# Patient Record
Sex: MALE | Race: WHITE | Employment: UNEMPLOYED | URBAN - METROPOLITAN AREA
[De-identification: names, ages, dates, MRNs, and addresses within clinical notes are randomized per-mention and may not be internally consistent; named-entity substitution may affect disease eponyms.]

---

## 2020-10-13 NOTE — H&P
[address] [facility] [address] Gastroenterology                                                                                                  Clinic History and Physical     Pa Outpatient Medications   Medication Sig Dispense Refill   • PEG 3350-KCl-Na Bicarb-NaCl (TRILYTE) 420 g Oral Recon Soln Take prep as directed by gastro office.  May substitute with Trilyte/generic equivalent if needed 1 Bottle 0   • glipiZIDE ER 2.5 MG Oral x4, and patient is having movements of all 4 extremities   Psych: Pt has a normal mood and affect, behavior is normal    Nursing note and vitals reviewed      Labs/Imaging:     Patient's labs and imaging were reviewed and discussed with patient today.   See 5-10% discussed. It is the patient's responsibility to contact his/her insurance company regarding questions about out-of-pocket cost/benefits and was provided the appropriate diagnostic information/codes.  All questions were answered to the patient’s sati

## 2020-10-27 ENCOUNTER — OFFICE VISIT (OUTPATIENT)
Dept: GASTROENTEROLOGY | Facility: CLINIC | Age: 50
End: 2020-10-27
Payer: MEDICAID

## 2020-10-27 ENCOUNTER — TELEPHONE (OUTPATIENT)
Dept: GASTROENTEROLOGY | Facility: CLINIC | Age: 50
End: 2020-10-27

## 2020-10-27 VITALS
TEMPERATURE: 98 F | BODY MASS INDEX: 32.37 KG/M2 | HEART RATE: 99 BPM | WEIGHT: 239 LBS | DIASTOLIC BLOOD PRESSURE: 86 MMHG | HEIGHT: 72 IN | SYSTOLIC BLOOD PRESSURE: 133 MMHG

## 2020-10-27 DIAGNOSIS — Z12.11 SCREENING FOR COLON CANCER: Primary | ICD-10-CM

## 2020-10-27 DIAGNOSIS — Z12.11 COLON CANCER SCREENING: Primary | ICD-10-CM

## 2020-10-27 PROCEDURE — 3075F SYST BP GE 130 - 139MM HG: CPT | Performed by: NURSE PRACTITIONER

## 2020-10-27 PROCEDURE — 99203 OFFICE O/P NEW LOW 30 MIN: CPT | Performed by: NURSE PRACTITIONER

## 2020-10-27 PROCEDURE — 3008F BODY MASS INDEX DOCD: CPT | Performed by: NURSE PRACTITIONER

## 2020-10-27 PROCEDURE — 3079F DIAST BP 80-89 MM HG: CPT | Performed by: NURSE PRACTITIONER

## 2020-10-27 RX ORDER — LOSARTAN POTASSIUM 50 MG/1
TABLET ORAL
COMMUNITY
Start: 2020-10-25

## 2020-10-27 RX ORDER — GLIPIZIDE 2.5 MG/1
2.5 TABLET, EXTENDED RELEASE ORAL DAILY
COMMUNITY
Start: 2020-10-13

## 2020-10-27 RX ORDER — BLOOD SUGAR DIAGNOSTIC
STRIP MISCELLANEOUS
COMMUNITY
Start: 2020-10-26

## 2020-10-27 RX ORDER — SITAGLIPTIN 100 MG/1
100 TABLET, FILM COATED ORAL DAILY
COMMUNITY
Start: 2020-09-28

## 2020-10-27 RX ORDER — POLYETHYLENE GLYCOL 3350, SODIUM CHLORIDE, SODIUM BICARBONATE, POTASSIUM CHLORIDE 420; 11.2; 5.72; 1.48 G/4L; G/4L; G/4L; G/4L
POWDER, FOR SOLUTION ORAL
Qty: 1 BOTTLE | Refills: 0 | Status: ON HOLD | OUTPATIENT
Start: 2020-10-27 | End: 2021-04-07

## 2020-10-27 NOTE — TELEPHONE ENCOUNTER
Scheduled for: Colonoscopy 33166   Provider Name: Dr Doreen Harrison    Date: Pati Flbina 1/21/2021   Location: [facility]   Sedation: MAC   Time: 8:15 am   Prep: split dose colyte   Meds/Allergies Reconciled?: NKDA   Diagnosis with codes: Screening Z12.11    Was patient informe

## 2020-10-27 NOTE — PATIENT INSTRUCTIONS
Recommend:  -Schedule colonoscopy w/ Dr. Chito Finch with MAC or Dr. Philippe Schaefer with [facility] or MAC  Dx: screening  -Eligible for NE: No r/t history of hypertension/diabetes  -Prep: Split dose Colyte/TriLyte or equivalent  -Anti-platelets and anti-coagulan

## 2021-01-13 ENCOUNTER — TELEPHONE (OUTPATIENT)
Dept: GASTROENTEROLOGY | Facility: CLINIC | Age: 51
End: 2021-01-13

## 2021-01-13 NOTE — TELEPHONE ENCOUNTER
Patient aware ok to take rosuvastatin the evening before the procedure. This is the only new medication. He is aware to hold glipizide/metformin/januvia the day before and day of the procedure and to hold losartan the night before/morning of procedure.

## 2021-01-13 NOTE — TELEPHONE ENCOUNTER
Patient has colonoscopy scheduled 1/21/2021 and is on a new medication rx:Rosuvastatin, has questions. Please call at:[phone],thanks.

## 2021-01-18 ENCOUNTER — TELEPHONE (OUTPATIENT)
Dept: GASTROENTEROLOGY | Facility: CLINIC | Age: 51
End: 2021-01-18

## 2021-01-18 DIAGNOSIS — Z12.11 COLON CANCER SCREENING: Primary | ICD-10-CM

## 2021-01-18 NOTE — TELEPHONE ENCOUNTER
Rescheduled for:  Colonoscopy 14738  Provider Name:  Dr. Sarah Oliveira  Date:    From-1/21/21  To-4/7/21  Location:    [facility]  Sedation:  MAC  Time:    From-0815  To-1230 (pt is aware to arrive at 1130)   Prep:  Nicki, mailed new instructions on 1/19/21  Meds/Allerg

## 2021-04-04 ENCOUNTER — LAB ENCOUNTER (OUTPATIENT)
Dept: LAB | Facility: HOSPITAL | Age: 51
End: 2021-04-04
Attending: INTERNAL MEDICINE
Payer: MEDICAID

## 2021-04-04 DIAGNOSIS — Z01.818 PRE-OP TESTING: ICD-10-CM

## 2021-04-07 ENCOUNTER — ANESTHESIA (OUTPATIENT)
Dept: ENDOSCOPY | Facility: HOSPITAL | Age: 51
End: 2021-04-07
Payer: MEDICAID

## 2021-04-07 ENCOUNTER — HOSPITAL ENCOUNTER (OUTPATIENT)
Facility: HOSPITAL | Age: 51
Setting detail: HOSPITAL OUTPATIENT SURGERY
Discharge: HOME | End: 2021-04-07
Attending: INTERNAL MEDICINE | Admitting: INTERNAL MEDICINE
Payer: MEDICAID

## 2021-04-07 ENCOUNTER — ANESTHESIA EVENT (OUTPATIENT)
Dept: ENDOSCOPY | Facility: HOSPITAL | Age: 51
End: 2021-04-07
Payer: MEDICAID

## 2021-04-07 VITALS
HEART RATE: 77 BPM | RESPIRATION RATE: 19 BRPM | SYSTOLIC BLOOD PRESSURE: 151 MMHG | HEIGHT: 72 IN | DIASTOLIC BLOOD PRESSURE: 85 MMHG | BODY MASS INDEX: 32.51 KG/M2 | TEMPERATURE: 97 F | OXYGEN SATURATION: 95 % | WEIGHT: 240 LBS

## 2021-04-07 DIAGNOSIS — Z12.11 COLON CANCER SCREENING: ICD-10-CM

## 2021-04-07 DIAGNOSIS — Z01.818 PRE-OP TESTING: Primary | ICD-10-CM

## 2021-04-07 PROCEDURE — 45385 COLONOSCOPY W/LESION REMOVAL: CPT | Performed by: INTERNAL MEDICINE

## 2021-04-07 PROCEDURE — 0DBK8ZX EXCISION OF ASCENDING COLON, VIA NATURAL OR ARTIFICIAL OPENING ENDOSCOPIC, DIAGNOSTIC: ICD-10-PCS | Performed by: INTERNAL MEDICINE

## 2021-04-07 PROCEDURE — 0DBL8ZX EXCISION OF TRANSVERSE COLON, VIA NATURAL OR ARTIFICIAL OPENING ENDOSCOPIC, DIAGNOSTIC: ICD-10-PCS | Performed by: INTERNAL MEDICINE

## 2021-04-07 RX ORDER — SODIUM CHLORIDE, SODIUM LACTATE, POTASSIUM CHLORIDE, CALCIUM CHLORIDE 600; 310; 30; 20 MG/100ML; MG/100ML; MG/100ML; MG/100ML
INJECTION, SOLUTION INTRAVENOUS CONTINUOUS
Status: DISCONTINUED | OUTPATIENT
Start: 2021-04-07 | End: 2021-04-07

## 2021-04-07 RX ORDER — DEXTROSE MONOHYDRATE 25 G/50ML
50 INJECTION, SOLUTION INTRAVENOUS
Status: DISCONTINUED | OUTPATIENT
Start: 2021-04-07 | End: 2021-04-07

## 2021-04-07 RX ORDER — NALOXONE HYDROCHLORIDE 0.4 MG/ML
80 INJECTION, SOLUTION INTRAMUSCULAR; INTRAVENOUS; SUBCUTANEOUS AS NEEDED
Status: DISCONTINUED | OUTPATIENT
Start: 2021-04-07 | End: 2021-04-07

## 2021-04-07 NOTE — OPERATIVE REPORT
[facility] HOSP - [facility] Endoscopy Report      Preoperative Diagnosis:  - colon cancer screening      Postoperative Diagnosis:  - colon polyps x 3  - internal hemorrhoids    Procedure:    Colonoscopy     Surgeon:  Sagrario Bob M.D.     Anesthesia:  MAC

## 2021-04-07 NOTE — H&P
History & Physical Examination    Patient Name: Carlos Powers  MRN: [id number]  CSN: [id number]  YOB: 1970    Diagnosis:   Colon cancer screening        glipiZIDE ER 2.5 MG Oral Tablet 24 Hr, Take 2.5 mg by mouth daily. , Disp: , Rfl: , 4/

## 2021-04-07 NOTE — ANESTHESIA PREPROCEDURE EVALUATION
Anesthesia PreOp Note    HPI:     Debbie Cornell is a 46year old male who presents for preoperative consultation requested by: Scott Medina MD    Date of Surgery: 4/7/2021    Procedure(s):  COLONOSCOPY  Indication: Colon cancer screening    Relev Smoker      Smokeless tobacco: Never Used    Vaping Use      Vaping Use: Never used    Substance and Sexual Activity      Alcohol use: Yes        Comment: socially      Drug use: Never      Sexual activity: Not on file    Other Topics      Concerns: Cardiovascular - normal exam  (+) hypertension,     Neuro/Psych - negative ROS     GI/Hepatic/Renal - negative ROS     Endo/Other    (+) diabetes mellitus,   Abdominal  - normal exam               Anesthesia Plan:   ASA:  3  Plan:   MAC  Post-op Pain Manag

## 2021-04-07 NOTE — ANESTHESIA POSTPROCEDURE EVALUATION
Patient: Carlitos [facility]    Procedure Summary     Date: 04/07/21 Room / Location: [address] [place] [address] ENDOSCOPY 05 / [address] [place] [address] ENDOSCOPY    Anesthesia Start: 1653 Anesthesia Stop:     Procedure: COLONOSCOPY (N/A ) Diagnosis:       Colon cancer screening      (colon polyps; h

## 2021-04-12 ENCOUNTER — TELEPHONE (OUTPATIENT)
Dept: GASTROENTEROLOGY | Facility: CLINIC | Age: 51
End: 2021-04-12

## 2021-04-12 NOTE — TELEPHONE ENCOUNTER
----- Message from Eduardo Mg MD sent at 4/9/2021  1:20 PM CDT -----  I wanted to get back to you with your colonoscopy results. You had 3 colon polyps removed which were benign.   I would advise a repeat colonoscopy in 3 years to make sure no new po

## 2024-02-06 ENCOUNTER — TELEPHONE (OUTPATIENT)
Facility: CLINIC | Age: 54
End: 2024-02-06

## 2024-02-06 NOTE — TELEPHONE ENCOUNTER
Patient outreach message received:    Patient saw Dr. Oconnell's note via [employer].  Recall put in for 3 years.     Recall reminder letter mailed out to patient.

## 2024-08-19 ENCOUNTER — OFFICE VISIT (OUTPATIENT)
Dept: OTOLARYNGOLOGY | Facility: CLINIC | Age: 54
End: 2024-08-19

## 2024-08-19 ENCOUNTER — OFFICE VISIT (OUTPATIENT)
Dept: AUDIOLOGY | Facility: CLINIC | Age: 54
End: 2024-08-19

## 2024-08-19 DIAGNOSIS — H91.8X3 ASYMMETRICAL HEARING LOSS: ICD-10-CM

## 2024-08-19 DIAGNOSIS — H93.11 TINNITUS, RIGHT: ICD-10-CM

## 2024-08-19 DIAGNOSIS — H61.23 BILATERAL IMPACTED CERUMEN: Primary | ICD-10-CM

## 2024-08-19 DIAGNOSIS — H93.11 TINNITUS, RIGHT: Primary | ICD-10-CM

## 2024-08-19 PROCEDURE — 92567 TYMPANOMETRY: CPT | Performed by: AUDIOLOGIST

## 2024-08-19 PROCEDURE — 92557 COMPREHENSIVE HEARING TEST: CPT | Performed by: AUDIOLOGIST

## 2024-08-19 PROCEDURE — 69210 REMOVE IMPACTED EAR WAX UNI: CPT | Performed by: STUDENT IN AN ORGANIZED HEALTH CARE EDUCATION/TRAINING PROGRAM

## 2024-08-19 PROCEDURE — 99204 OFFICE O/P NEW MOD 45 MIN: CPT | Performed by: STUDENT IN AN ORGANIZED HEALTH CARE EDUCATION/TRAINING PROGRAM

## 2024-08-19 NOTE — PROGRESS NOTES
Rickie Castillo is a 54 year old male.   Chief Complaint   Patient presents with    Ear Problem     Ear cleaning  Right ringing in ear      HPI:   54-year-old presents with tinnitus in the right ear that is nonpulsatile for about 1 to 2 years.  He said it happened after an ear cleaning where he had ringing in both his ears but the left ear has resolved    Current Outpatient Medications   Medication Sig Dispense Refill    glipiZIDE ER 2.5 MG Oral Tablet 24 Hr Take 1 tablet (2.5 mg total) by mouth daily.      losartan Potassium 50 MG Oral Tab       metFORMIN HCl 500 MG Oral Tab Take by mouth 2 (two) times daily.      JANUVIA 100 MG Oral Tab Take 1 tablet (100 mg total) by mouth daily.      ONETOUCH ULTRA In Vitro Strip       Multiple Vitamins-Minerals (CENTRUM SILVER 50+MEN) Oral Tab Take by mouth.        Past Medical History:    Diabetes (HCC)    Essential hypertension      Social History:  Social History     Socioeconomic History    Marital status: Unknown   Tobacco Use    Smoking status: Never    Smokeless tobacco: Never   Vaping Use    Vaping status: Never Used   Substance and Sexual Activity    Alcohol use: Yes     Comment: socially    Drug use: Never     Social Determinants of Health      Received from [facility]      Past Surgical History:   Procedure Laterality Date    Colonoscopy N/A 4/7/2021    Procedure: COLONOSCOPY;  Surgeon: Flo Oconnell MD;  Location: [facility] ENDOSCOPY         EXAM:   There were no vitals taken for this visit.    System Details   Skin Inspection - Normal.   Constitutional Overall appearance - Normal.   Head/Face Symmetric, TMJ tenderness not present    Eyes EOMI, PERRL   Right ear:  Canal clear, TM intact, no STEVE   Left ear:  Canal clear, TM intact, no STEVE   Nose: Septum midline, inferior turbinates not enlarged, nasal valves without collapse    Oral cavity/Oropharynx: No lesions or masses on inspection or palpation, tonsils symmetric    Neck: Soft without LAD,  thyroid not enlarged  Voice clear/ no stridor   Other:      SCOPES AND PROCEDURES:     Canals:  Left: Canal with cerumen preventing adequate view of TM, debrided with instrumentation  Right: Canal with cerumen preventing adequate view of TM, debrided with instrumentation    Tympanic Membranes:  Left: Normal tympanic membrane.   Right: Normal tympanic membrane.     TM Visualized Method:   Left TM examined via otomicroscopy.    Right TM examined via otomicroscopy.      PROCEDURE:   Removal of cerumen impaction   The cerumen impaction was completely removed on the left and right sides using microscopy as necessary.   Removal was completed by using a curette and suction.     AUDIOGRAM AND IMAGING:         IMPRESSION:   1. Bilateral impacted cerumen    2. Tinnitus, right    3. Asymmetrical hearing loss       Recommendations:  -Audiogram with a slight high-frequency asymmetric sensorineural hearing loss worse in the right ear by about 10 dB at 3000 Hz and up without noise exposure on this side  -Discussed the small chance 1-3% that this may represent an acoustic neuroma given the unilateral tinnitus and hearing loss and the utility of an MRI  -He wishes to currently observe the issue with a repeat audiogram in 8 to 12 months and consider an MRI if it is worsening at that point  -Discussed tinnitus and tinnitus coping methods with him as well    The patient indicates understanding of these issues and agrees to the plan.      Jj Rivera MD  8/19/2024  1:48 PM

## 2024-09-09 ENCOUNTER — TELEPHONE (OUTPATIENT)
Facility: CLINIC | Age: 54
End: 2024-09-09

## 2024-09-09 DIAGNOSIS — Z86.0100 PERSONAL HISTORY OF COLONIC POLYPS: ICD-10-CM

## 2024-09-09 DIAGNOSIS — Z12.11 COLON CANCER SCREENING: Primary | ICD-10-CM

## 2024-09-09 NOTE — TELEPHONE ENCOUNTER
Flo Oconnell MD   Physician  Gastroenterology     Operative Report     Signed     Date of Service: 4/7/2021  1:07 PM  Case Time: Procedures: Surgeons:   4/7/2021 12:41 PM COLONOSCOPY    Flo Oconnell MD               Signed         [facility] Endoscopy Report        Preoperative Diagnosis:  - colon cancer screening        Postoperative Diagnosis:  - colon polyps x 3  - internal hemorrhoids     Procedure:    Colonoscopy      Surgeon:  Flo Oconnell M.D.     Anesthesia:  MAC sedation     Technique:  After informed consent, the patient was placed in the left lateral recumbent position.  Digital rectal examination revealed no palpable intraluminal abnormalities.  An Olympus variable stiffness 190 series HD colonoscope was inserted into the rectum and advanced under direct vision by following the lumen to the cecum.  The colon was examined upon withdrawal in the left lateral position.  The procedure was well tolerated without immediate complication.        Findings:  The preparation of the colon was good.  The terminal ileum was examined for 4 cm and visually normal.  The ileocecal valve was well preserved. The visualized colonic mucosa from the cecum to the anal verge was normal with an intact vascular pattern.     Colon polyps x3 removed as follows;  -Ascending x2, one of the polyps had a serrated adenomatous appearance and was approximately 8 to 9 mm in size and cold snare removed.  The other polyp was sessile approximately 4 mm in size and cold snare removed.  -Transverse x1, serrated adenomatous appearance removed from the transverse colon by cold snare technique, approximately 6 mm in size.  All polypectomy sites were inspected and found to be free of bleeding and specimens retrieved and sent for analysis.     On retroflexed view small internal hemorrhoids were noted.     Specimens-colon polyps as outlined above     Estimated blood loss-insignificant     Impression:  - colon polyps x 3  -  internal hemorrhoids     Recommendations:  - Post polypectomy instructions given  - Repeat colonoscopy in 3- 7 years  - Symptomatic treatment of hemorrhoids              Flo Oconnell MD  4/7/2021  1:07 PM               Electronically signed by Flo Oconnell MD at 4/7/2021  1:10 PM    Flo Oconnell MD  4/9/2021  1:20 PM CDT       I wanted to get back to you with your colonoscopy results.  You had 3 colon polyps removed which were benign.  I would advise a repeat colonoscopy in 3 years to make sure no new polyps are forming.       You also have internal hemorrhoids.  Please stay on a high fiber diet and call with any questions.            Specimen to Pathology, Tissue Release Upon Ordering: [id number]  Order: [id number]   Collected 4/7/2021 12:53 PM       Status: Final result       Visible to patient: Yes (seen)       Dx: Colon cancer screening    2 Result Notes       1 Patient Communication       1 Follow-up Encounter        Component  Ref Range & Units      Case Report     Surgical Pathology                                Case: [id number]                                     Authorizing Provider:  Flo Oconnell MD       Collected:           04/07/2021 12:53 PM             Ordering Location:     [facility]          Received:            04/07/2021 02:19 PM                                    Endoscopy Lab Suites                                                           Pathologist:           Greg Grant MD                                                             Specimens:   A) - Colon ascending, polyp x2                                                                        B) - Colon transverse, polyp                                                                  Final Diagnosis:        A.  Ascending colon polyps (2); polypectomy:  Fragment of tubular adenoma and fragments of sessile serrated adenoma (1.5 cm in maximum dimension in aggregate).  No evidence of severe dysplasia/carcinoma  in situ or infiltrating carcinoma identified.       B.  Transverse colon polyp; polypectomy:  Fragments of sessile serrated adenoma (1.1 cm in maximum dimension in aggregate).  No evidence of severe dysplasia/carcinoma in situ or infiltrating carcinoma identified.        Electronically signed by Greg Grant MD on 4/8/2021 at  9:37 AM        Clinical Information      Z12.11 Colon Cancer Screening.   Colon polyps, hemorrhoids.     Gross Description      Specimen A is submitted in formalin labeled “Shomo, ascending colon polyps x2” and consists of multiple fragments of pink-tan soft tissue measuring in aggregate 1.5 x 0.7 x 0.2 cm. The specimen is submitted entirely in cassette A1.      Specimen B is submitted in formalin labeled “Shomo, transverse colon polyp” and consists of two fragments of pink-tan soft tissue measuring in aggregate 1.1 x 0.6 x 0.2 cm. The specimen is submitted entirely in cassette B1. (jq)     Greg Grant M.D./[facility] [place]     Interpretation     Benign     Electronically signed by Greg Grant MD on 4/8/2021 at  9:37 AM     [facility] ([facility])                Specimen Collected: 04/07/21 12:53 PM Last Resulted: 04/08/21  9:37 AM

## 2024-09-11 NOTE — TELEPHONE ENCOUNTER
Dr. Oconnell    Patient called to schedule 3 year recall colonoscopy.  Please provide orders if ok to schedule directly.    Thank you    Last Procedure, Date, MD:  Colonoscopy Dr. Oconnell 4/7/2021  Last Diagnosis:  Colon polyps x 3, internal hemorrhoids  Recalled (mth/yrs):  3 years  Sedation Used Previously:  MAC  Last Prep Used (if known):  Colyte  Quality Of Prep (if known): good  Anticoagulants: no  Diabetic Med's (PO/Injectables): glipizide, januvia, metformin  Weight loss Med's: no  Iron/Herbal/Multivitamin Supplements (RX/OTC): Multivitamin   Marijuana/Vaping/CBD: no  Height & Weight: 6'1\" 240 lbs  BMI: 31.7  Hx of Cardiac/CVA Issues/(MI/Stroke): no  Devices Pacemaker/Defibrillator/Stents: no  Respiratory Issues/Oxygen Use/GEOVANI/COPD: no  Issues w/ Anesthesia: no    Symptoms (Y/N): no  Symptoms Details: n/a    Special Comments/Notes: n/a    Please advise on orders and prep.     Thank you!

## 2024-09-17 NOTE — TELEPHONE ENCOUNTER
Colonoscopy for colon cancer screening and hx colon polyp   Golytely   MAC     Hold 1 day -glipizide, metformin   Hold 4 days -januvia

## 2024-09-27 NOTE — TELEPHONE ENCOUNTER
Scheduled for:  Colonoscopy 71287  Provider Name:  Dr. Oconnell  Date:  2/10/2025  Location:[facility]  Sedation:  MAC  Time:  (pt is aware that ENDO  will call the day before to confirm arrival time)    Prep:  Golytely  Meds/Allergies Reconciled?:  Physician Reviewed   Diagnosis with codes:  Colon screening Z12.11, History of colon polyps Z86.010,  Was patient informed to call insurance with codes (Y/N):  Yes  Referral sent?:  Referral was sent at the time of electronic surgical scheduling.  EM or [facility] notified?:  I sent an electronic request to Endo Scheduling and received a confirmation today.  Medication Orders:     Hold 1 day -glipizide, metformin   Hold 4 days -januvia     Pt is aware to NOT take iron pills, herbal meds and diet supplements for 7 days before exam. Also to NOT take any form of alcohol, recreational drugs and any forms of ED meds 24 hours before exam.   Misc Orders:       Further instructions given by staff:  I provide prep instructions to my chart , by the phone  and reviewed date, time and location, he verbalized that he understood and is aware to call if he has any questions.

## 2025-01-17 ENCOUNTER — TELEPHONE (OUTPATIENT)
Facility: CLINIC | Age: 55
End: 2025-01-17

## 2025-01-31 NOTE — TELEPHONE ENCOUNTER
Called patient - confirmed he wants to keep his colonoscopy with Dr. Oconnell scheduled as is for 2/10/2025 at [facility].    Telephone encounter closed.

## 2025-02-10 ENCOUNTER — HOSPITAL ENCOUNTER (OUTPATIENT)
Facility: HOSPITAL | Age: 55
Setting detail: HOSPITAL OUTPATIENT SURGERY
Discharge: HOME | End: 2025-02-10
Attending: INTERNAL MEDICINE | Admitting: INTERNAL MEDICINE
Payer: MEDICAID

## 2025-02-10 ENCOUNTER — ANESTHESIA EVENT (OUTPATIENT)
Dept: ENDOSCOPY | Facility: HOSPITAL | Age: 55
End: 2025-02-10
Payer: MEDICAID

## 2025-02-10 ENCOUNTER — ANESTHESIA (OUTPATIENT)
Dept: ENDOSCOPY | Facility: HOSPITAL | Age: 55
End: 2025-02-10
Payer: MEDICAID

## 2025-02-10 DIAGNOSIS — Z86.0100 PERSONAL HISTORY OF COLONIC POLYPS: ICD-10-CM

## 2025-02-10 DIAGNOSIS — Z12.11 COLON CANCER SCREENING: ICD-10-CM

## 2025-02-10 DIAGNOSIS — Z86.0100 HISTORY OF COLONIC POLYPS: ICD-10-CM

## 2025-02-10 LAB — GLUCOSE BLDC GLUCOMTR-MCNC: 146 MG/DL (ref 70–99)

## 2025-02-10 PROCEDURE — 0DBK8ZX EXCISION OF ASCENDING COLON, VIA NATURAL OR ARTIFICIAL OPENING ENDOSCOPIC, DIAGNOSTIC: ICD-10-PCS | Performed by: INTERNAL MEDICINE

## 2025-02-10 PROCEDURE — 45385 COLONOSCOPY W/LESION REMOVAL: CPT | Performed by: INTERNAL MEDICINE

## 2025-02-10 PROCEDURE — 0DBM8ZX EXCISION OF DESCENDING COLON, VIA NATURAL OR ARTIFICIAL OPENING ENDOSCOPIC, DIAGNOSTIC: ICD-10-PCS | Performed by: INTERNAL MEDICINE

## 2025-02-10 RX ORDER — NICOTINE POLACRILEX 4 MG
15 LOZENGE BUCCAL
Status: DISCONTINUED | OUTPATIENT
Start: 2025-02-10 | End: 2025-02-10

## 2025-02-10 RX ORDER — NICOTINE POLACRILEX 4 MG
30 LOZENGE BUCCAL
Status: DISCONTINUED | OUTPATIENT
Start: 2025-02-10 | End: 2025-02-10

## 2025-02-10 RX ORDER — PROCHLORPERAZINE EDISYLATE 5 MG/ML
5 INJECTION INTRAMUSCULAR; INTRAVENOUS EVERY 8 HOURS PRN
Status: DISCONTINUED | OUTPATIENT
Start: 2025-02-10 | End: 2025-02-10

## 2025-02-10 RX ORDER — MIDAZOLAM HYDROCHLORIDE 1 MG/ML
INJECTION INTRAMUSCULAR; INTRAVENOUS AS NEEDED
Status: DISCONTINUED | OUTPATIENT
Start: 2025-02-10 | End: 2025-02-10 | Stop reason: SURG

## 2025-02-10 RX ORDER — ONDANSETRON 2 MG/ML
4 INJECTION INTRAMUSCULAR; INTRAVENOUS EVERY 6 HOURS PRN
Status: DISCONTINUED | OUTPATIENT
Start: 2025-02-10 | End: 2025-02-10

## 2025-02-10 RX ORDER — SODIUM CHLORIDE, SODIUM LACTATE, POTASSIUM CHLORIDE, CALCIUM CHLORIDE 600; 310; 30; 20 MG/100ML; MG/100ML; MG/100ML; MG/100ML
INJECTION, SOLUTION INTRAVENOUS CONTINUOUS
Status: DISCONTINUED | OUTPATIENT
Start: 2025-02-10 | End: 2025-02-10

## 2025-02-10 RX ORDER — NALOXONE HYDROCHLORIDE 0.4 MG/ML
0.08 INJECTION, SOLUTION INTRAMUSCULAR; INTRAVENOUS; SUBCUTANEOUS AS NEEDED
Status: DISCONTINUED | OUTPATIENT
Start: 2025-02-10 | End: 2025-02-10

## 2025-02-10 RX ORDER — LIDOCAINE HYDROCHLORIDE 10 MG/ML
INJECTION, SOLUTION EPIDURAL; INFILTRATION; INTRACAUDAL; PERINEURAL AS NEEDED
Status: DISCONTINUED | OUTPATIENT
Start: 2025-02-10 | End: 2025-02-10 | Stop reason: SURG

## 2025-02-10 RX ORDER — DEXTROSE MONOHYDRATE 25 G/50ML
50 INJECTION, SOLUTION INTRAVENOUS
Status: DISCONTINUED | OUTPATIENT
Start: 2025-02-10 | End: 2025-02-10

## 2025-02-10 RX ORDER — SODIUM CHLORIDE, SODIUM LACTATE, POTASSIUM CHLORIDE, CALCIUM CHLORIDE 600; 310; 30; 20 MG/100ML; MG/100ML; MG/100ML; MG/100ML
INJECTION, SOLUTION INTRAVENOUS CONTINUOUS PRN
Status: DISCONTINUED | OUTPATIENT
Start: 2025-02-10 | End: 2025-02-10 | Stop reason: SURG

## 2025-02-10 RX ADMIN — MIDAZOLAM HYDROCHLORIDE 2 MG: 1 INJECTION INTRAMUSCULAR; INTRAVENOUS at 07:44:00

## 2025-02-10 RX ADMIN — SODIUM CHLORIDE, SODIUM LACTATE, POTASSIUM CHLORIDE, CALCIUM CHLORIDE: 600; 310; 30; 20 INJECTION, SOLUTION INTRAVENOUS at 07:42:00

## 2025-02-10 RX ADMIN — LIDOCAINE HYDROCHLORIDE 50 MG: 10 INJECTION, SOLUTION EPIDURAL; INFILTRATION; INTRACAUDAL; PERINEURAL at 07:46:00

## 2025-02-10 NOTE — OPERATIVE REPORT
[facility] Endoscopy Report  Date of procedure-February 10, 2025    Preoperative Diagnosis:  -Colorectal cancer screening  -History colon polyps    Postoperative Diagnosis:  -Colon polyps x 2  -Internal hemorrhoids    Procedure:    Colonoscopy     Surgeon:  Flo Oconnell M.D.    Anesthesia:  MAC     Technique:  After informed consent, the patient was placed in the left lateral recumbent position.  Digital rectal examination revealed no palpable intraluminal abnormalities.  An Olympus variable stiffness 190 series HD colonoscope was inserted into the rectum and advanced under direct vision by following the lumen to the cecum.  The colon was examined upon withdrawal in the left lateral position.    The procedure was well tolerated without immediate complication.      Findings:  The preparation of the colon was good.  The terminal ileum was examined for 4 cm and visually normal.  The ileocecal valve was well preserved. The visualized colonic mucosa from the cecum to the anal verge was normal with an intact vascular pattern.    Colon polyps x 2 removed as follows;  -Ascending x 1, sessile 4 mm in size and cold snare removed.  -Descending x 1, sessile 4 mm in size and cold snare removed.  Both polypectomy sites inspected and found to be free of bleeding and specimens retrieved and sent for analysis.    Small internal hemorrhoids noted on retroflexed view.    Estimated blood loss-insignificant  Specimens-see above    Impression:  -Colon polyps x 2  -Internal hemorrhoids    Recommendations:  - Post polypectomy instructions given  - Repeat colonoscopy in 5 years  - Symptomatic treatment of hemorrhoids          Flo Oconnell MD  2/10/2025  8:18 AM

## 2025-02-10 NOTE — ANESTHESIA PREPROCEDURE EVALUATION
Anesthesia PreOp Note    HPI:     Rickie Castillo is a 55 year old male who presents for preoperative consultation requested by: Flo Oconnell MD    Date of Surgery: 2/10/2025    Procedure(s):  COLONOSCOPY  Indication: Colon cancer screening/ Personal history of colonic polyps    Relevant Problems   No relevant active problems       NPO:  Last Liquid Consumption Date: 02/10/25  Last Liquid Consumption Time: 0630  Last Solid Consumption Date: 02/09/25  Last Solid Consumption Time: 0800  Last Liquid Consumption Date: 02/10/25          History Review:  There are no active problems to display for this patient.      Past Medical History:    Diabetes (HCC)    Essential hypertension    High blood pressure    Visual impairment    glasses       Past Surgical History:   Procedure Laterality Date    Colonoscopy N/A 04/07/2021    Procedure: COLONOSCOPY;  Surgeon: Flo Oconnell MD;  Location: [facility] ENDOSCOPY    Colonoscopy N/A 02/10/2025    ;       Prescriptions Prior to Admission[1]  Current Medications and Prescriptions Ordered in Epic[2]    Allergies[3]    History reviewed. No pertinent family history.  Social History     Socioeconomic History    Marital status: Unknown   Tobacco Use    Smoking status: Never    Smokeless tobacco: Never   Vaping Use    Vaping status: Never Used   Substance and Sexual Activity    Alcohol use: Yes     Comment: socially    Drug use: Never       Available pre-op labs reviewed.             Vital Signs:  Body mass index is 31.66 kg/m².   height is 1.854 m (6' 1\") and weight is 108.9 kg (240 lb). His blood pressure is 160/85 and his pulse is 90. His respiration is 12 and oxygen saturation is 97%.   Vitals:    02/03/25 1721 02/10/25 0702   BP:  160/85   Pulse:  90   Resp:  12   SpO2:  97%   Weight: 108.9 kg (240 lb)    Height: 1.854 m (6' 1\")         Anesthesia Evaluation     Patient summary reviewed and Nursing notes reviewed    Airway   Mallampati: III  Dental      Comment: Denies  loose    Pulmonary - negative ROS and normal exam   Cardiovascular - normal exam  (+) hypertension    Neuro/Psych - negative ROS     GI/Hepatic/Renal - negative ROS     Endo/Other    (+) diabetes mellitus  Abdominal   (+) obese                 Anesthesia Plan:   ASA:  2  Plan:   MAC      I have informed Rickie Castillo and/or legal guardian or family member of the nature of the anesthetic plan, benefits, risks including possible dental damage if relevant, major complications, and any alternative forms of anesthetic management.   All of the patient's questions were answered to the best of my ability. The patient desires the anesthetic management as planned.  Kady Kohli MD  2/10/2025 7:07 AM  Present on Admission:  **None**           [1]   Medications Prior to Admission   Medication Sig Dispense Refill Last Dose/Taking    glipiZIDE ER 2.5 MG Oral Tablet 24 Hr Take 1 tablet (2.5 mg total) by mouth daily.   2/5/2025    losartan Potassium 50 MG Oral Tab Take 1 tablet (50 mg total) by mouth daily.   2/5/2025    metFORMIN HCl 500 MG Oral Tab Take by mouth 2 (two) times daily.   2/8/2025    JANUVIA 100 MG Oral Tab Take 1 tablet (100 mg total) by mouth daily.   2/5/2025    ONETOUCH ULTRA In Vitro Strip    Taking    Multiple Vitamins-Minerals (CENTRUM SILVER 50+MEN) Oral Tab Take by mouth.   2/5/2025   [2]   Current Facility-Administered Medications Ordered in Epic   Medication Dose Route Frequency Provider Last Rate Last Admin    lactated ringers infusion   Intravenous Continuous Flo Oconnell MD         No current [facility]-ordered outpatient medications on file.   [3] No Known Allergies

## 2025-02-10 NOTE — H&P
History & Physical Examination    Patient Name: Rickie Castillo  MRN: [id number]  CSN: [id number]  YOB: 1970    Diagnosis:   Colon screening  Hx colon polyps       Prescriptions Prior to Admission[1]  Current Facility-Administered Medications   Medication Dose Route Frequency    lactated ringers infusion   Intravenous Continuous       Allergies: Allergies[2]    Past Medical History:    Diabetes (HCC)    Essential hypertension    High blood pressure    Visual impairment    glasses     Past Surgical History:   Procedure Laterality Date    Colonoscopy N/A 04/07/2021    Procedure: COLONOSCOPY;  Surgeon: Flo Oconnell MD;  Location: [facility] ENDOSCOPY    Colonoscopy N/A 02/10/2025    ;     History reviewed. No pertinent family history.  Social History     Tobacco Use    Smoking status: Never    Smokeless tobacco: Never   Substance Use Topics    Alcohol use: Yes     Comment: socially       SYSTEM Check if Review is Normal Check if Physical Exam is Normal If not normal, please explain:   HEENT [x ] [ x]    NECK & BACK [x ] [x ]    HEART [x ] [ x]    LUNGS [x ] [ x]    ABDOMEN [x ] [x ]    UROGENITAL [ ] [ ]    EXTREMITIES [x ] [x ]    OTHER        [ x ] I have discussed the risks and benefits and alternatives with the patient/family.  They understand and agree to proceed with plan of care.  [ x ] I have reviewed the History and Physical done within the last 30 days.  Any changes noted above.    Flo Oconnell MD  2/10/2025  7:24 AM         [1]   Medications Prior to Admission   Medication Sig Dispense Refill Last Dose/Taking    glipiZIDE ER 2.5 MG Oral Tablet 24 Hr Take 1 tablet (2.5 mg total) by mouth daily.   2/5/2025    losartan Potassium 50 MG Oral Tab Take 1 tablet (50 mg total) by mouth daily.   2/5/2025    metFORMIN HCl 500 MG Oral Tab Take by mouth 2 (two) times daily.   2/8/2025    JANUVIA 100 MG Oral Tab Take 1 tablet (100 mg total) by mouth daily.   2/5/2025    ONETOUCH ULTRA In Vitro  Strip    Taking    Multiple Vitamins-Minerals (CENTRUM SILVER 50+MEN) Oral Tab Take by mouth.   2/5/2025   [2] No Known Allergies

## 2025-02-10 NOTE — ANESTHESIA POSTPROCEDURE EVALUATION
Patient: Rickie Castillo    Procedure Summary       Date: 02/10/25 Room / Location: [facility] ENDOSCOPY 03 / [facility] ENDOSCOPY    Anesthesia Start: 0742 Anesthesia Stop: 0811    Procedure: COLONOSCOPY Diagnosis:       Colon cancer screening      Personal history of colonic polyps      (polyps,hemorrhoids)    Surgeons: Flo Oconnell MD Anesthesiologist: Kady Kohli MD    Anesthesia Type: MAC ASA Status: 2            Anesthesia Type: MAC    Vitals Value Taken Time   /74 02/10/25 0810   Temp 36.4 02/10/25 0812   Pulse 101 02/10/25 0811   Resp 18 02/10/25 0811   SpO2 93 % 02/10/25 0811   Vitals shown include unfiled device data.    [facility] AN Post Evaluation:   Patient Evaluated in PACU  Patient Participation: complete - patient participated  Level of Consciousness: awake and responsive to verbal stimuli  Pain Score: 0  Pain Management: adequate  Airway Patency:patent  Dental exam unchanged from preop  Yes    Nausea/Vomiting: none  Cardiovascular Status: hemodynamically stable  Respiratory Status: nonlabored ventilation and spontaneous ventilation  Postoperative Hydration euvolemic      Kady Kohli MD  2/10/2025 8:12 AM

## 2025-02-10 NOTE — DISCHARGE INSTRUCTIONS
Home Care Instructions for Colonoscopy with Sedation    Diet:  - Resume your regular diet as tolerated unless otherwise instructed.  - Start with light meals to minimize bloating.  - Do not drink alcohol today.    Medication:  - If you have questions about resuming your normal medications, please contact your Primary Care Physician.    Activities:  - Take it easy today. Do not return to work today.  - Do not drive today.  - Do not operate any machinery today (including kitchen equipment).    Colonoscopy:  - You may notice some rectal \"spotting\" (a little blood on the toilet tissue) for a day or two after the exam. This is normal.  - If you experience any rectal bleeding (not spotting), persistent tenderness or sharp severe abdominal pains, oral temperature over 100 degrees Fahrenheit, light-headedness or dizziness, or any other problems, contact your doctor.    **If unable to reach your doctor, please go to the [facility] Emergency Room**    - Your referring physician will receive a full report of your examination.  - If you do not hear from your doctor's office within two weeks of your biopsy, please call them for your results.    You may be able to see your laboratory results in [employer] between 4 and 7 business days.  In some cases, your physician may not have viewed the results before they are released to [employer].  If you have questions regarding your results contact the physician who ordered the test/exam by phone or via [employer] by choosing \"Ask a Medical Question.\"

## 2025-02-11 ENCOUNTER — TELEPHONE (OUTPATIENT)
Facility: CLINIC | Age: 55
End: 2025-02-11

## 2025-02-11 VITALS
BODY MASS INDEX: 31.81 KG/M2 | OXYGEN SATURATION: 93 % | RESPIRATION RATE: 17 BRPM | DIASTOLIC BLOOD PRESSURE: 84 MMHG | HEIGHT: 73 IN | WEIGHT: 240 LBS | HEART RATE: 89 BPM | SYSTOLIC BLOOD PRESSURE: 142 MMHG

## 2025-02-11 NOTE — TELEPHONE ENCOUNTER
Patient contacted,date of birth verified and message from Dr. Oconnell given.  Patient voiced understanding.  No further questions at this time.     Health maintenance updated.  5 year colonoscopy recall placed in patient outreach.Next due on 02/10/2030 per Dr. Oconnell.

## 2025-02-11 NOTE — TELEPHONE ENCOUNTER
----- Message from Flo Oconnell sent at 2/10/2025  5:14 PM CST -----  I wanted to get back to you with your colonoscopy results.  You had 2 colon polyps removed which were benign.  I would advise a repeat colonoscopy in 5 years to make sure no new polyps are forming.      You also have internal hemorrhoids.  Please stay on a high fiber diet and call with any questions.

## (undated) DEVICE — LINE MNTR ADLT SET O2 INTMD

## (undated) DEVICE — V2 SPECIMEN COLLECTION MANIFOLD KIT: Brand: NEPTUNE

## (undated) DEVICE — KIT CLEAN ENDOKIT 1.1OZ GOWNX2

## (undated) DEVICE — SNARE OPTMZ PLPCTM TRP

## (undated) DEVICE — MEDI-VAC NON-CONDUCTIVE SUCTION TUBING 6MM X 1.8M (6FT.) L: Brand: CARDINAL HEALTH

## (undated) DEVICE — LASSO POLYPECTOMY SNARE: Brand: LASSO

## (undated) DEVICE — 60 ML SYRINGE REGULAR TIP: Brand: MONOJECT

## (undated) DEVICE — Device: Brand: DEFENDO AIR/WATER/SUCTION AND BIOPSY VALVE

## (undated) DEVICE — V2 SPECIMEN COLLECTION TRAY: Brand: NEPTUNE

## (undated) DEVICE — 35 ML SYRINGE REGULAR TIP: Brand: MONOJECT

## (undated) DEVICE — Device

## (undated) DEVICE — SNARE ENDOSCOPIC 10MM ROUND

## (undated) DEVICE — KIT ENDO ORCAPOD 160/180/190

## (undated) DEVICE — Device: Brand: CUSTOM PROCEDURE KIT

## (undated) NOTE — LETTER
[facility]  Administration of Anesthesia  I, Rickie Castillo agree to be cared for by a physician anesthesiologist alone and/or with a nurse anesthetist, who is specially trained to monitor me and give me medicine to put me to sleep or keep me comfortable during my procedure    I understand that my anesthesiologist and/or anesthetist is not an employee or agent of [facility] or [employer]. He or she works for [facility]    As the patient asking for anesthesia services, I agree to:  Allow the anesthesiologist (anesthesia doctor) to give me medicine and do additional procedures as necessary. Some examples are: Starting or using an “IV” to give me medicine, fluids or blood during my procedure, and having a breathing tube placed to help me breathe when I’m asleep (intubation). In the event that my heart stops working properly, I understand that my anesthesiologist will make every effort to sustain my life, unless otherwise directed by [facility] Do Not Resuscitate documents.  Tell my anesthesia doctor before my procedure:  If I am pregnant.  The last time that I ate or drank.  iii. All of the medicines I take (including prescriptions, herbal supplements, and pills I can buy without a prescription (including street drugs/illegal medications). Failure to inform my anesthesiologist about these medicines may increase my risk of anesthetic complications.  iv.If I am allergic to anything or have had a reaction to anesthesia before.  I understand how the anesthesia medicine will help me (benefits).  I understand that with any type of anesthesia medicine there are risks:  The most common risks are: nausea, vomiting, sore throat, muscle soreness, damage to my eyes, mouth, or teeth (from breathing tube placement).  Rare risks include: remembering what happened during my procedure, allergic reactions to medications, injury to my airway, heart, lungs, vision, nerves, or  muscles and in extremely rare instances death.  My doctor has explained to me other choices available to me for my care (alternatives).  Pregnant Patients (“epidural”):  I understand that the risks of having an epidural (medicine given into my back to help control pain during labor), include itching, low blood pressure, difficulty urinating, headache or slowing of the baby’s heart. Very rare risks include infection, bleeding, seizure, irregular heart rhythms and nerve injury.  Regional Anesthesia (“spinal”, “epidural”, & “nerve blocks”):  I understand that rare but potential complications include headache, bleeding, infection, seizure, irregular heart rhythms, and nerve injury.    _____________________________________________________________________________  Patient (or Representative) Signature/Relationship to Patient  Date   Time    _____________________________________________________________________________   Name (if used)    Language/Organization   Time    _____________________________________________________________________________  Nurse Anesthetist Signature     Date   Time  _____________________________________________________________________________  Anesthesiologist Signature     Date   Time  I have discussed the procedure and information above with the patient (or patient’s representative) and answered their questions. The patient or their representative has agreed to have anesthesia services.    _____________________________________________________________________________  Witness        Date   Time  I have verified that the signature is that of the patient or patient’s representative, and that it was signed before the procedure  Patient Name: Rickie Castillo     : 1970                 Printed: 2025 at 8:54 AM    Medical Record #: [id number]                                            Page 1 of 1  ----------ANESTHESIA CONSENT----------

## (undated) NOTE — LETTER
2/6/2024    Rickie Castillo        [address] [address]            Dear Rickie Castillo,      Our records indicate that you are due for an appointment for a Colonoscopy with Flo Oconnell MD. Our doctors are booking out about 3-6 months in advance for procedures.     Please call our office to schedule a phone screening appointment to plan for the procedure(s).   Your medical well-being is important to us.    If your insurance requires a referral, please call your primary care office to request one.      Thank you,      The Physicians and Staff at [facility]

## (undated) NOTE — LETTER
[facility]  [address], [place], [place]    Authorization for Surgical Operation and Procedure                               I hereby authorize Flo Oconnell MD, my physician and his/her assistants (if applicable), which may include medical students, residents, and/or fellows, to perform the following surgical operation/ procedure and administer such anesthesia as may be determined necessary by my physician: Operation/Procedure name (s) COLONOSCOPY on Rickie Castillo   2.   I recognize that during the surgical operation/procedure, unforeseen conditions may necessitate additional or different procedures than those listed above.  I, therefore, further authorize and request that the above-named surgeon, assistants, or designees perform such procedures as are, in their judgment, necessary and desirable.    3.   My surgeon/physician has discussed prior to my surgery the potential benefits, risks and side effects of this procedure; the likelihood of achieving goals; and potential problems that might occur during recuperation.  They also discussed reasonable alternatives to the procedure, including risks, benefits, and side effects related to the alternatives and risks related to not receiving this procedure.  I have had all my questions answered and I acknowledge that no guarantee has been made as to the result that may be obtained.    4.   Should the need arise during my operation/procedure, which includes change of level of care prior to discharge, I also consent to the administration of blood and/or blood products.  Further, I understand that despite careful testing and screening of blood or blood products by collecting agencies, I may still be subject to ill effects as a result of receiving a blood transfusion and/or blood products.  The following are some, but not all, of the potential risks that can occur: fever and allergic reactions, hemolytic reactions, transmission of diseases such  as Hepatitis, AIDS and Cytomegalovirus (CMV) and fluid overload.  In the event that I wish to have an autologous transfusion of my own blood, or a directed donor transfusion, I will discuss this with my physician.  Check only if Refusing Blood or Blood Products  I understand refusal of blood or blood products as deemed necessary by my physician may have serious consequences to my condition to include possible death. I hereby assume responsibility for my refusal and release the hospital, its personnel, and my physicians from any responsibility for the consequences of my refusal.    o  Refuse   5.   I authorize the use of any specimen, organs, tissues, body parts or foreign objects that may be removed from my body during the operation/procedure for diagnosis, research or teaching purposes and their subsequent disposal by hospital authorities.  I also authorize the release of specimen test results and/or written reports to my treating physician on the hospital medical staff or other referring or consulting physicians involved in my care, at the discretion of the Pathologist or my treating physician.    6.   I consent to the photographing or videotaping of the operations or procedures to be performed, including appropriate portions of my body for medical, scientific, or educational purposes, provided my identity is not revealed by the pictures or by descriptive texts accompanying them.  If the procedure has been photographed/videotaped, the surgeon will obtain the original picture, image, videotape or CD.  The hospital will not be responsible for storage, release or maintenance of the picture, image, tape or CD.    7.   I consent to the presence of a  or observers in the operating room as deemed necessary by my physician or their designees.    8.   I recognize that in the event my procedure results in extended X-Ray/fluoroscopy time, I may develop a skin reaction.    9. If I have a Do Not Attempt  Resuscitation (DNAR) order in place, that status will be suspended while in the operating room, procedural suite, and during the recovery period unless otherwise explicitly stated by me (or a person authorized to consent on my behalf). The surgeon or my attending physician will determine when the applicable recovery period ends for purposes of reinstating the DNAR order.  10. Patients having a sterilization procedure: I understand that if the procedure is successful the results will be permanent and it will therefore be impossible for me to inseminate, conceive, or bear children.  I also understand that the procedure is intended to result in sterility, although the result has not been guaranteed.   11. I acknowledge that my physician has explained sedation/analgesia administration to me including the risk and benefits I consent to the administration of sedation/analgesia as may be necessary or desirable in the judgment of my physician.    I CERTIFY THAT I HAVE READ AND FULLY UNDERSTAND THE ABOVE CONSENT TO OPERATION and/or OTHER PROCEDURE.     ____________________________________  _________________________________        ______________________________  Signature of Patient    Signature of Responsible Person                Printed Name of Responsible Person                                      ____________________________________  _____________________________                ________________________________  Signature of Witness        Date  Time         Relationship to Patient    STATEMENT OF PHYSICIAN My signature below affirms that prior to the time of the procedure; I have explained to the patient and/or his/her legal representative, the risks and benefits involved in the proposed treatment and any reasonable alternative to the proposed treatment. I have also explained the risks and benefits involved in refusal of the proposed treatment and alternatives to the proposed treatment and have answered the patient's  questions. If I have a significant financial interest in a co-management agreement or a significant financial interest in any product or implant, or other significant relationship used in this procedure/surgery, I have disclosed this and had a discussion with my patient.     _____________________________________________________              _____________________________  (Signature of Physician)                                                                                         (Date)                                   (Time)  Patient Name: Rickie Castillo      : 1970      Printed: 2025     Medical Record #: [id number]                                      Page 1 of 1